# Patient Record
Sex: MALE | Race: OTHER | HISPANIC OR LATINO
[De-identification: names, ages, dates, MRNs, and addresses within clinical notes are randomized per-mention and may not be internally consistent; named-entity substitution may affect disease eponyms.]

---

## 2023-02-24 PROBLEM — Z00.00 ENCOUNTER FOR PREVENTIVE HEALTH EXAMINATION: Status: ACTIVE | Noted: 2023-02-24

## 2023-02-27 ENCOUNTER — APPOINTMENT (OUTPATIENT)
Dept: PEDIATRIC ORTHOPEDIC SURGERY | Facility: CLINIC | Age: 43
End: 2023-02-27
Payer: SELF-PAY

## 2023-02-27 ENCOUNTER — APPOINTMENT (OUTPATIENT)
Dept: PEDIATRIC ORTHOPEDIC SURGERY | Facility: CLINIC | Age: 43
End: 2023-02-27

## 2023-02-27 VITALS — HEIGHT: 66 IN | WEIGHT: 205 LBS | BODY MASS INDEX: 32.95 KG/M2

## 2023-02-27 VITALS — DIASTOLIC BLOOD PRESSURE: 97 MMHG | SYSTOLIC BLOOD PRESSURE: 158 MMHG | TEMPERATURE: 97.1 F

## 2023-02-27 DIAGNOSIS — Z83.3 FAMILY HISTORY OF DIABETES MELLITUS: ICD-10-CM

## 2023-02-27 DIAGNOSIS — Z82.49 FAMILY HISTORY OF ISCHEMIC HEART DISEASE AND OTHER DISEASES OF THE CIRCULATORY SYSTEM: ICD-10-CM

## 2023-02-27 PROCEDURE — 99202 OFFICE O/P NEW SF 15 MIN: CPT | Mod: 25

## 2023-02-27 PROCEDURE — 20552 NJX 1/MLT TRIGGER POINT 1/2: CPT

## 2023-02-27 RX ORDER — PREDNISONE 10 MG/1
10 TABLET ORAL
Qty: 60 | Refills: 0 | Status: ACTIVE | COMMUNITY
Start: 2023-02-27 | End: 1900-01-01

## 2023-02-27 NOTE — ASSESSMENT
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  I have placed him on a 2-week course of prednisone with GI precautions.  He is also been placed on Flexeril and will use oxycodone prescribed by the hospital at night to help him sleep.  Physical therapy has been ordered as well he is not capable of working as a .  He will return in 2 weeks for further evaluation

## 2023-02-27 NOTE — PHYSICAL EXAM
[de-identified] : His examination today reveals he is obviously very uncomfortable with a slow gait.  It is symmetric.  He has moderate restriction of motion in the lumbar spine in all planes with obvious discomfort.  There is diffuse spasm and tenderness on both sides of the midline on the lumbar segment with a trigger point in the right lumbar musculature.  He has discomfort over the sciatic notches on both sides as well.  Both lower extremities are symmetric in appearance without atrophy and move well at all individual joints.  Straight leg raising is positive on the right at 40 degrees and very uncomfortable on the left.  Neurologically he has no gross focal motor deficit.  Deep tendon reflexes 2/4 both knees and 1/4 ankles.\par \par His MRI from [facility] recently performed was reviewed written results are on the chart documenting disc at L4-5 which has extruded and is behind the L5 vertebral body.  There is neurologic effacement.

## 2023-02-27 NOTE — HISTORY OF PRESENT ILLNESS
[de-identified] : This 43-year-old healthy pleasant gentleman is seen today for evaluation of severe back pain.  This patient had acute onset of severe back pain approximately 3 weeks ago with no obvious history of trauma precipitating event.  He does work for a  though again no obvious injury.  His back pain progressed quite rapidly with radiation into both lower extremities right greater than left.  He has numbness and paresthesias on both sides right greater than left.  He did present to the emergency room at [facility] just recently where MRI was performed revealing a herniated disc and he was sent home with prescriptions for Percocet Motrin and omeprazole.  He has not had any improvement whatsoever and is significantly disabled.  He did see his internist at [facility] who prescribed a Medrol Dosepak he is retirement into this no improvement noted.  Prior to this he had no significant history of back pain requiring medical attention.  His past history is negative

## 2023-02-27 NOTE — PROCEDURE
[FreeTextEntry1] : Under sterile technique with an alcohol prep to trigger point along the right lumbar musculature was injected with 40 mg of methylprednisolone and 4 cc of 1% lidocaine with a Band-Aid dressing applied at the conclusion this was tolerated well

## 2023-03-13 ENCOUNTER — APPOINTMENT (OUTPATIENT)
Dept: PEDIATRIC ORTHOPEDIC SURGERY | Facility: CLINIC | Age: 43
End: 2023-03-13
Payer: SELF-PAY

## 2023-03-13 ENCOUNTER — TRANSCRIPTION ENCOUNTER (OUTPATIENT)
Age: 43
End: 2023-03-13

## 2023-03-13 VITALS — BODY MASS INDEX: 32.95 KG/M2 | WEIGHT: 205 LBS | TEMPERATURE: 97.2 F | HEIGHT: 66 IN

## 2023-03-13 VITALS — SYSTOLIC BLOOD PRESSURE: 148 MMHG | DIASTOLIC BLOOD PRESSURE: 90 MMHG

## 2023-03-13 DIAGNOSIS — M51.26 OTHER INTERVERTEBRAL DISC DISPLACEMENT, LUMBAR REGION: ICD-10-CM

## 2023-03-13 DIAGNOSIS — M79.18 MYALGIA, OTHER SITE: ICD-10-CM

## 2023-03-13 PROCEDURE — 99212 OFFICE O/P EST SF 10 MIN: CPT

## 2023-03-13 RX ORDER — PREDNISONE 10 MG/1
10 TABLET ORAL
Qty: 60 | Refills: 0 | Status: ACTIVE | COMMUNITY
Start: 2023-03-13 | End: 1900-01-01

## 2023-03-13 NOTE — PHYSICAL EXAM
[de-identified] : On exam he is visibly much more comfortable than his prior visit.  He has better motion to the back with less spasm and tenderness.  His gait however reveals he can heel walk but he cannot go up on his toes on either side.  Straight leg raising is positive on both sides at 45 degrees.  Motor testing reveals he is weak in his hamstrings as well as gastrocs.  Knee jerks are 2/4 ankle jerks 0/4

## 2023-03-13 NOTE — HISTORY OF PRESENT ILLNESS
[de-identified] : This 43-year-old returns for reevaluation of his back and leg pain.  Following a 2-week course of prednisone he has improved with regards to his back pain he still however has paresthesias and numbness in both lower extremities right greater than left.  He has discomfort on ambulation.  No bladder or bowel dysfunction he does feel weakness in his legs.

## 2023-03-13 NOTE — ASSESSMENT
[FreeTextEntry1] : Impression: Herniated disc lumbar spine\par \par As he had a fair amount of improvement with the prednisone I have ordered a second 2-week course.  Physical therapy prescription has been provided.  I have advised this patient and his wife as to the significance of the weakness.  If this is not improving and in fact worsening it is likely he will come to surgical treatment.  We will try to get him into pain management for possible epidural steroid injections.
[Follow-Up] : a follow-up evaluation of

## 2023-03-20 ENCOUNTER — APPOINTMENT (OUTPATIENT)
Dept: PAIN MANAGEMENT | Facility: CLINIC | Age: 43
End: 2023-03-20
Payer: SELF-PAY

## 2023-03-20 VITALS
DIASTOLIC BLOOD PRESSURE: 98 MMHG | OXYGEN SATURATION: 98 % | HEART RATE: 123 BPM | SYSTOLIC BLOOD PRESSURE: 144 MMHG | BODY MASS INDEX: 32.95 KG/M2 | WEIGHT: 205 LBS | HEIGHT: 66 IN

## 2023-03-20 PROCEDURE — 99243 OFF/OP CNSLTJ NEW/EST LOW 30: CPT

## 2023-03-20 NOTE — PHYSICAL EXAM
[de-identified] : General: Well-developed and well-nourished.  No acute distress.\par Psychiatric: Behavior was cooperative  \par Head:  Normocephalic and atraumatic\par Eyes:  Sclera white. Conjunctiva and eyelids pink and moist without discharge.\par Cardiovascular:  Regular\par Respiratory:  Trachea midline. Normal effort.\par No accessory muscle use with respiration\par Abdomen: Non distended, soft and nontender\par Skin:  No rashes, ulcers, or lesions appreciated.\par Neck: There is no pain with extension,     ROM wnl\par Back there is restricted range of motion in all planes, pain with lumbar extension\par SLR positive RIGHT\par Extremities: No edema \par Musculoskeletal: Moving all extremities freely \par Neuro: CN 2-12 Grossly intact\par Sensation to light touch is intact in all extremities\par Gait:  Antalgic gait

## 2023-03-20 NOTE — HISTORY OF PRESENT ILLNESS
[10 (pain as bad as you can imagine)] : 1. What number best describes your pain on average in the past week? 10/10 pain [10 (completely interferes)] : 3. What number best describes how, during the past week, pain has interfered with your general activity? 10/10 pain [Back Pain] : back pain [___ wks] : [unfilled] week(s) ago [Constant] : constant [10] : a maximum pain level of 10/10 [Sharp] : sharp [Dull] : dull [Aching] : aching [Throbbing] : throbbing [Shooting] : shooting [Electric] : electric [Standing] : standing [Walking] : walking [Lifting] : lifting [Medications] : medications [Heat] : heat [Ice] : ice [FreeTextEntry1] : HPI - Mr. CHARITY ANGELA is a 43 year M with PHx as below, referred by Dr Aguero who presents today with chief complaint of low back and lower extremity pain. Reports that it developed insidiously several weeks ago, prompting him to go to the emergency room at [facility] where he had an MRI completed.  The pain is located in the lower back;  there is radiation of the pain into the right lower extremity.  The pain is presently 10 out of 10 in severity on the numerical rating scale.  The pain is constant.  There is diurnal worsening during the night.  The pain is aggravated with standing, walking, bending.  The pain improves mildly upon laying down.  He reports the pain is physically and emotionally disabling for him as it impairs his ability to go about activities of daily living such as routine housework, cleaning, grooming himself.  \par \par Lengthy hemispheric weakness of compromise he had been seeing Dr. Aguero who had provided him with cyclobenzaprine, prednisone that had been providing some mild relief however his gait remains impaired\par  \par Reports there is no present numbness, there is no weakness. Patient denies any bowel/bladder incontinence, no saddle/perineal anesthesia or any other red flag signs or symptoms. Reports regular BMs.  [FreeTextEntry2] : 30 [FreeTextEntry7] : Patient presents with lower back pain radiating to right toes. [de-identified] : tingling, numbness [FreeTextEntry4] : PT

## 2023-03-20 NOTE — CONSULT LETTER
[Dear  ___] : Dear  [unfilled], [Consult Letter:] : I had the pleasure of evaluating your patient, [unfilled]. [Please see my note below.] : Please see my note below. [Consult Closing:] : Thank you very much for allowing me to participate in the care of this patient.  If you have any questions, please do not hesitate to contact me. [Sincerely,] : Sincerely, [FreeTextEntry3] : Ravinder Lezama DO

## 2023-03-20 NOTE — REVIEW OF SYSTEMS
[Back Pain] : back pain [Radiating Pain] : radiating pain [Decreased ROM] : decreased range of motion [Weakness] : weakness [Negative] : Heme/Lymph

## 2023-03-20 NOTE — ASSESSMENT
[FreeTextEntry1] : Mr. CHARITY ANGELA is a 43 year M suffering from low back and right leg pain, that based upon today's subjective complaints, physical examination, and MRI review, is likely secondary to lumbar radiculopathy\par \par >> Medications\par \par Chronic opioid use for non-malignant pain, in particular at high doses would not be recommended since it can potentially lead to hyperalgesia (hypersensitivity), tolerance and addiction. \par  \par Gabapentin 400 m ghs\par  \par >> Interventions\par  \par Arrange for RIGHT L4 and L5 transforaminal epidural steroid injection under fluoroscopic guidance. Success rate and possible complications discussed with patient in detail. \par  \par >> Therapy and Other Modalities\par  \par Continue with daily home stretching regimen\par \par >> Imaging and Other Studies\par \par See Media \par \par >> Consults\par \par None ordered

## 2023-03-20 NOTE — DATA REVIEWED
[FreeTextEntry1] : MRI lumbar \par February 22, 2023\par L4-5 posterior disc herniation with extrusion extending posteriorly to the L5 vertebral body.  There is central and slightly eccentric to the right, mild central stenosis at L4-5 secondary to hypertrophy of the ligamentum flavum and mild diffuse disc bulge.\par L5-S1 there is mild diffuse disc bulge eccentric to the left without significant foraminal or central compromise.

## 2023-03-23 RX ORDER — MELOXICAM 7.5 MG/1
7.5 TABLET ORAL
Qty: 15 | Refills: 0 | Status: ACTIVE | COMMUNITY
Start: 2023-03-23 | End: 1900-01-01

## 2023-04-12 ENCOUNTER — APPOINTMENT (OUTPATIENT)
Dept: PAIN MANAGEMENT | Facility: HOSPITAL | Age: 43
End: 2023-04-12

## 2023-04-12 ENCOUNTER — TRANSCRIPTION ENCOUNTER (OUTPATIENT)
Age: 43
End: 2023-04-12

## 2023-04-28 ENCOUNTER — APPOINTMENT (OUTPATIENT)
Dept: PAIN MANAGEMENT | Facility: CLINIC | Age: 43
End: 2023-04-28
Payer: SELF-PAY

## 2023-04-28 VITALS
OXYGEN SATURATION: 98 % | BODY MASS INDEX: 30.53 KG/M2 | SYSTOLIC BLOOD PRESSURE: 120 MMHG | HEIGHT: 66 IN | WEIGHT: 190 LBS | HEART RATE: 88 BPM | DIASTOLIC BLOOD PRESSURE: 67 MMHG

## 2023-04-28 PROCEDURE — 99214 OFFICE O/P EST MOD 30 MIN: CPT

## 2023-04-28 NOTE — PHYSICAL EXAM
[de-identified] : General: Well-developed and well-nourished.  No acute distress.\par Psychiatric: Behavior was cooperative  \par Head:  Normocephalic and atraumatic\par Eyes:  Sclera white. Conjunctiva and eyelids pink and moist without discharge.\par Cardiovascular:  Regular\par Respiratory:  Trachea midline. Normal effort.\par No accessory muscle use with respiration\par Abdomen: Non distended, soft and nontender\par Skin:  No rashes, ulcers, or lesions appreciated.\par Neck: There is no pain with extension,     ROM wnl\par Back there is restricted range of motion in all planes, pain with lumbar extension\par SLR positive RIGHT\par Extremities: No edema \par Musculoskeletal: Moving all extremities freely \par Neuro: CN 2-12 Grossly intact\par Sensation to light touch is intact in all extremities\par Gait:  Antalgic gait

## 2023-04-28 NOTE — HISTORY OF PRESENT ILLNESS
[5] : a current pain level of 5/10 [FreeTextEntry1] : Interval Note:\par \par Since last visit the pain intensity has improved substantially, approximately 80% relief status post right L4 and L5 lumbar transforaminal epidural steroid injection on April 12, 2023 \par \par Expressing gratitude however still with\par \par Medication has been allowing patient to go about activities of daily living with less pain.\par \par Moving bowels regularly. No recent falls. \par \par Denies weakness, numbness. Patient denies any bowel/bladder incontinence, no saddle/perineal anesthesia or any other red flag signs or symptoms. \par \par \par \par --\par \par HPI - Mr. CHARITY ANGELA is a 43 year M with PHx as below, referred by Dr Aguero who presents today with chief complaint of low back and lower extremity pain. Reports that it developed insidiously several weeks ago, prompting him to go to the emergency room at [facility] where he had an MRI completed.  The pain is located in the lower back;  there is radiation of the pain into the right lower extremity.  The pain is presently 10 out of 10 in severity on the numerical rating scale.  The pain is constant.  There is diurnal worsening during the night.  The pain is aggravated with standing, walking, bending.  The pain improves mildly upon laying down.  He reports the pain is physically and emotionally disabling for him as it impairs his ability to go about activities of daily living such as routine housework, cleaning, grooming himself.  \par \par Lengthy hemispheric weakness of compromise he had been seeing Dr. Aguero who had provided him with cyclobenzaprine, prednisone that had been providing some mild relief however his gait remains impaired\par  \par Reports there is no present numbness, there is no weakness. Patient denies any bowel/bladder incontinence, no saddle/perineal anesthesia or any other red flag signs or symptoms. Reports regular BMs.

## 2023-05-25 ENCOUNTER — APPOINTMENT (OUTPATIENT)
Dept: PAIN MANAGEMENT | Facility: HOSPITAL | Age: 43
End: 2023-05-25

## 2023-05-25 ENCOUNTER — TRANSCRIPTION ENCOUNTER (OUTPATIENT)
Age: 43
End: 2023-05-25

## 2023-06-08 ENCOUNTER — APPOINTMENT (OUTPATIENT)
Dept: PAIN MANAGEMENT | Facility: CLINIC | Age: 43
End: 2023-06-08
Payer: SELF-PAY

## 2023-06-08 VITALS
HEART RATE: 75 BPM | DIASTOLIC BLOOD PRESSURE: 79 MMHG | HEIGHT: 66 IN | SYSTOLIC BLOOD PRESSURE: 125 MMHG | WEIGHT: 190 LBS | BODY MASS INDEX: 30.53 KG/M2 | OXYGEN SATURATION: 98 %

## 2023-06-08 PROCEDURE — 99213 OFFICE O/P EST LOW 20 MIN: CPT

## 2023-06-08 RX ORDER — TIZANIDINE 2 MG/1
2 TABLET ORAL
Qty: 60 | Refills: 1 | Status: ACTIVE | COMMUNITY
Start: 2023-06-08 | End: 1900-01-01

## 2023-06-08 NOTE — PHYSICAL EXAM
[de-identified] : General Appearance:\par Level of consciousness: Alert\par Body habitus: Well-nourished\par Hygiene: Clean\par \par Psychiatric:\par Appropriate mood and affect. \par Cooperative.\par  \par Skin: Nailbeds pink with no cyanosis or clubbing.\par Lesions or rashes: None observed\par \par Head and Neck: The head is normocephalic and atraumatic\par Mouth and throat: Trachea Midline, teeth and gums are without obvious lesion\par \par Respiratory:\par Breathing pattern: Normal\par Use of accessory muscles: Absent\par Cough: Absent\par \par Cardiovascular System:\par Pulse: Regular\par Cyanosis: Absent\par \par Abdomen:\par Inspection: No distention\par Visible pulsations: Absent\par \par Musculoskeletal System:\par Muscle strength:   strength is normal bilaterally.\par Gait: Steady, NO assistive device\par Posture: Upright\par \par Spine:\par No gross deformity or signs of trauma.\par Spinous processes are midline. NO tenderness of spinous processes.\par Paraspinal musculature is NOT hypertonic\par NO discomfort is noted with flexion\par MILD discomfort is noted with extension\par \par Straight leg raise test is negative bilaterally. \par \par Neurological System:\par Cranial nerves 2-12: Grossly intact \par Motor function: Moving all extremities against gravity\par Dorsi/plantar flexion is normal bilaterally.\par Sensation: No gross deficit to light touch

## 2023-06-08 NOTE — HISTORY OF PRESENT ILLNESS
[0] : a current pain level of 0/10 [Back Pain] : back pain [FreeTextEntry1] : Interval Note:\par \par \par Status post right L4 and L5 lumbar transforaminal epidural steroid injection on May 25, 2023 with report of 95 % relief\par \par Continues to go to physical therapy\par  \par Medication (gabapentin) has been allowing patient to go about activities of daily living with less pain.\par \par Moving bowels regularly. No recent falls. \par \par Denies weakness, numbness. Patient denies any bowel/bladder incontinence, no saddle/perineal anesthesia or any other red flag signs or symptoms. \par \par --\par \par 80% relief status post right L4 and L5 lumbar transforaminal epidural steroid injection on April 12, 2023 \par \par --\par \par HPI - Mr. CHARITY ANGELA is a 43 year M with PHx as below, referred by Dr Aguero who presents today with chief complaint of low back and lower extremity pain. Reports that it developed insidiously several weeks ago, prompting him to go to the emergency room at [facility] where he had an MRI completed.  The pain is located in the lower back;  there is radiation of the pain into the right lower extremity.  The pain is presently 10 out of 10 in severity on the numerical rating scale.  The pain is constant.  There is diurnal worsening during the night.  The pain is aggravated with standing, walking, bending.  The pain improves mildly upon laying down.  He reports the pain is physically and emotionally disabling for him as it impairs his ability to go about activities of daily living such as routine housework, cleaning, grooming himself.  \par \par Lengthy hemispheric weakness of compromise he had been seeing Dr. Aguero who had provided him with cyclobenzaprine, prednisone that had been providing some mild relief however his gait remains impaired\par  \par Reports there is no present numbness, there is no weakness. Patient denies any bowel/bladder incontinence, no saddle/perineal anesthesia or any other red flag signs or symptoms. Reports regular BMs.

## 2023-06-08 NOTE — REVIEW OF SYSTEMS
[Back Pain] : back pain [Radiating Pain] : radiating pain [Joint Pain] : joint pain [Negative] : Heme/Lymph

## 2023-06-08 NOTE — ASSESSMENT
[FreeTextEntry1] : Mr. CHARITY ANGELA is a 43 year M suffering from low back and right leg pain, that based upon today's subjective complaints, physical examination, and MRI review, is likely secondary to lumbar radiculopathy\par \par Return to work letter provided /scanned to chart\par \par >> Medications\par \par Chronic opioid use for non-malignant pain, in particular at high doses would not be recommended since it can potentially lead to hyperalgesia (hypersensitivity), tolerance and addiction. \par  \par Gabapentin 400 m ghs\par \par Tizanidine 2 mg po bid \par  \par >> Interventions\par  \par Excellent relief following SOCO series\par \par >> Therapy and Other Modalities\par  \par Continue with daily home stretching regimen\par \par >> Imaging and Other Studies\par \par See Media \par \par >> Consults\par \par None ordered

## 2023-07-14 ENCOUNTER — APPOINTMENT (OUTPATIENT)
Dept: PAIN MANAGEMENT | Facility: CLINIC | Age: 43
End: 2023-07-14
Payer: SELF-PAY

## 2023-07-14 VITALS
OXYGEN SATURATION: 97 % | HEART RATE: 63 BPM | SYSTOLIC BLOOD PRESSURE: 132 MMHG | DIASTOLIC BLOOD PRESSURE: 87 MMHG | BODY MASS INDEX: 31.98 KG/M2 | HEIGHT: 66 IN | WEIGHT: 199 LBS

## 2023-07-14 PROCEDURE — 99213 OFFICE O/P EST LOW 20 MIN: CPT

## 2023-07-14 NOTE — ASSESSMENT
[FreeTextEntry1] : Mr. CHARITY ANGELA is a 43 year M suffering from low back and right leg pain, that based upon today's subjective complaints, physical examination, and MRI review, is likely secondary to lumbar radiculopathy\par \par Return to work letter provided /scanned to chart\par \par >> Medications\par \par Chronic opioid use for non-malignant pain, in particular at high doses would not be recommended since it can potentially lead to hyperalgesia (hypersensitivity), tolerance and addiction. \par  \par Gabapentin 400 m ghs \par  \par >> Interventions\par  \par Arrange for LEFT L4/5 interlaminar epidural steroid injection under fluoroscopic guidance. Success rate and possible complications discussed with patient in detail. \par \par >> Therapy and Other Modalities\par  \par Continue with daily home stretching regimen\par \par >> Imaging and Other Studies\par \par See Media \par \par >> Consults\par \par NSX eval

## 2023-07-14 NOTE — PHYSICAL EXAM
[de-identified] : General: AAOx3, NAD\par HEENT: EOMI, Sclera white\par CVS: +2 Pulses\par Pulmonary: No Respiratory Distress\par Abdomen: Soft, NT, ND\par MSK: Moving all extremities against gravity\par Neuro: Sensation I to LT\par SLR positive LEFT\par Extremities: (-)Edema\par Derm: No Rash\par Psych: Calm, Cooperative\par

## 2023-07-14 NOTE — HISTORY OF PRESENT ILLNESS
[4] : a current pain level of 4/10 [Back Pain] : back pain [FreeTextEntry1] : Interval Note:\par  \par Continues to go to physical therapy however pain is arising also on the left side, with report of tingling into the leg as well\par  \par Medication (gabapentin) has been allowing patient to go about activities of daily living with less pain.\par \par Moving bowels regularly. No recent falls. \par \par Patient denies any bowel/bladder incontinence, no saddle/perineal anesthesia or any other red flag signs or symptoms. \par \par --\par  t right L4 and L5 lumbar transforaminal epidural steroid injection on May 25, 2023 with report of 95 % relief\par \par 80% relief status post right L4 and L5 lumbar transforaminal epidural steroid injection on April 12, 2023 \par \par --\par \par HPI - Mr. CHARITY ANGELA is a 43 year M with PHx as below, referred by Dr Aguero who presents today with chief complaint of low back and lower extremity pain. Reports that it developed insidiously several weeks ago, prompting him to go to the emergency room at [facility] where he had an MRI completed.  The pain is located in the lower back;  there is radiation of the pain into the right lower extremity.  The pain is presently 10 out of 10 in severity on the numerical rating scale.  The pain is constant.  There is diurnal worsening during the night.  The pain is aggravated with standing, walking, bending.  The pain improves mildly upon laying down.  He reports the pain is physically and emotionally disabling for him as it impairs his ability to go about activities of daily living such as routine housework, cleaning, grooming himself.  \par \par Lengthy hemispheric weakness of compromise he had been seeing Dr. Aguero who had provided him with cyclobenzaprine, prednisone that had been providing some mild relief however his gait remains impaired\par  \par Reports there is no present numbness, there is no weakness. Patient denies any bowel/bladder incontinence, no saddle/perineal anesthesia or any other red flag signs or symptoms. Reports regular BMs.

## 2023-08-09 ENCOUNTER — APPOINTMENT (OUTPATIENT)
Dept: PAIN MANAGEMENT | Facility: HOSPITAL | Age: 43
End: 2023-08-09

## 2023-08-09 ENCOUNTER — TRANSCRIPTION ENCOUNTER (OUTPATIENT)
Age: 43
End: 2023-08-09

## 2023-08-23 ENCOUNTER — APPOINTMENT (OUTPATIENT)
Dept: PAIN MANAGEMENT | Facility: CLINIC | Age: 43
End: 2023-08-23
Payer: SELF-PAY

## 2023-08-23 VITALS
HEART RATE: 80 BPM | SYSTOLIC BLOOD PRESSURE: 121 MMHG | DIASTOLIC BLOOD PRESSURE: 73 MMHG | OXYGEN SATURATION: 97 % | BODY MASS INDEX: 30.22 KG/M2 | WEIGHT: 188 LBS | HEIGHT: 66 IN

## 2023-08-23 DIAGNOSIS — M79.10 MYALGIA, UNSPECIFIED SITE: ICD-10-CM

## 2023-08-23 DIAGNOSIS — M48.061 SPINAL STENOSIS, LUMBAR REGION WITHOUT NEUROGENIC CLAUDICATION: ICD-10-CM

## 2023-08-23 DIAGNOSIS — M54.16 RADICULOPATHY, LUMBAR REGION: ICD-10-CM

## 2023-08-23 PROCEDURE — 99214 OFFICE O/P EST MOD 30 MIN: CPT

## 2023-08-23 RX ORDER — CYCLOBENZAPRINE HYDROCHLORIDE 10 MG/1
10 TABLET, FILM COATED ORAL TWICE DAILY
Qty: 30 | Refills: 1 | Status: ACTIVE | COMMUNITY
Start: 2023-02-27 | End: 1900-01-01

## 2023-08-23 RX ORDER — GABAPENTIN 400 MG/1
400 CAPSULE ORAL
Qty: 60 | Refills: 1 | Status: ACTIVE | COMMUNITY
Start: 2023-03-20 | End: 1900-01-01

## 2023-08-23 NOTE — HISTORY OF PRESENT ILLNESS
[0] : a current pain level of 0/10 [FreeTextEntry1] : Interval Note:    Status post left L4/L5 interlaminar epidural steroid injection on August 9, 2023 - 90% relief   Moving bowels regularly. No recent falls.   Patient denies any bowel/bladder incontinence, no saddle/perineal anesthesia or any other red flag signs or symptoms.   -- Right L4 and L5 lumbar transforaminal epidural steroid injection on May 25, 2023 with report of 95 % relief  80% relief status post right L4 and L5 lumbar transforaminal epidural steroid injection on April 12, 2023   --  HPI - Mr. CHARITY ANGELA is a 43 year M with PHx as below, referred by Dr Aguero who presents today with chief complaint of low back and lower extremity pain. Reports that it developed insidiously several weeks ago, prompting him to go to the emergency room at [facility] where he had an MRI completed.  The pain is located in the lower back;  there is radiation of the pain into the right lower extremity.  The pain is presently 10 out of 10 in severity on the numerical rating scale.  The pain is constant.  There is diurnal worsening during the night.  The pain is aggravated with standing, walking, bending.  The pain improves mildly upon laying down.  He reports the pain is physically and emotionally disabling for him as it impairs his ability to go about activities of daily living such as routine housework, cleaning, grooming himself.    Lengthy hemispheric weakness of compromise he had been seeing Dr. Aguero who had provided him with cyclobenzaprine, prednisone that had been providing some mild relief however his gait remains impaired   Reports there is no present numbness, there is no weakness. Patient denies any bowel/bladder incontinence, no saddle/perineal anesthesia or any other red flag signs or symptoms. Reports regular BMs.

## 2023-08-23 NOTE — ASSESSMENT
[FreeTextEntry1] : Mr. CHARITY ANGELA is a 43 year M suffering from low back and right leg pain, that based upon today's subjective complaints, physical examination, and MRI review, is likely secondary to lumbar radiculopathy  Return to work letter provided /scanned to chart  >> Medications  Chronic opioid use for non-malignant pain, in particular at high doses would not be recommended since it can potentially lead to hyperalgesia (hypersensitivity), tolerance and addiction.    Gabapentin 400 m s    >> Interventions  Excellent relief following SOCO earlier this month  >> Therapy and Other Modalities   Continue with daily home stretching regimen  >> Imaging and Other Studies  See Media   >> Consults  NSX eval - considering 3x SOCO's since this Spring and possibility of recurrence, for surgical eval

## 2023-08-23 NOTE — PHYSICAL EXAM
[de-identified] : General: AAOx3, NAD HEENT: EOMI, Sclera white CVS: +2 Pulses Pulmonary: No Respiratory Distress Abdomen: Soft, NT, ND MSK: Moving all extremities against gravity Neuro: Sensation I to LT Extremities: (-)Edema Derm: No Rash Psych: Calm, Cooperative

## 2023-08-30 NOTE — PHYSICAL EXAM
[General Appearance - Alert] : alert [General Appearance - In No Acute Distress] : in no acute distress [Fluency] : fluency intact [Comprehension] : comprehension intact [Cranial Nerves Optic (II)] : visual acuity intact bilaterally,  pupils equal round and reactive to light [Cranial Nerves Oculomotor (III)] : extraocular motion intact [Cranial Nerves Trigeminal (V)] : facial sensation intact symmetrically [Cranial Nerves Facial (VII)] : face symmetrical [Cranial Nerves Glossopharyngeal (IX)] : tongue and palate midline [Cranial Nerves Vestibulocochlear (VIII)] : hearing was intact bilaterally [Cranial Nerves Accessory (XI - Cranial And Spinal)] : head turning and shoulder shrug symmetric [Cranial Nerves Hypoglossal (XII)] : there was no tongue deviation with protrusion [Motor Strength] : muscle strength was normal in all four extremities [Sensation Tactile Decrease] : light touch was intact [Abnormal Walk] : normal gait [2+] : Ankle jerk left 2+

## 2023-08-30 NOTE — HISTORY OF PRESENT ILLNESS
[de-identified] : CHARITY ANGELA is a 43 year male with no significant medical history who presents to the office today for neurosurgical consultation at the request of Dr. Lezama due to lumbar disc herniation and radiculopathy.   The pain is characterized as sharp, aching, shooting, electric, throbbing in nature, 10 out of 10 on the pain scale.  It started several weeks ago prompting him to go to [facility]. It is exacerbated by standing, walking, bending, worse at night, and relieved partially by rest.  It radiates from the low back to right lower extremity. The pain interferes with his ability to perform ADLs such as routine housework, cleaning, grooming. There has been no known trauma precipitating the pain.  The patient denies numbness of extremities, weakness of extremities, bowel or bladder incontinence and gait disturbance.  He has tried SOCO (Received post left L4/L5 interlaminar epidural steroid injection, Right L4 and L5 lumbar transforaminal SOCO on 5/25/23, right L4 and L5 lumbar transforaminal SOCO on 4/12/23 , PT, and pain medications including Prednisone, Cyclobenzaprine, Gabapentin, Lidocaine patch in the last *** without relief.  He has undergone imaging in the form of *** which revealed *** (see detailed report below).   He underwent imaging in the form of MR lumbar spine which revealed L4-5 posterior disc herniation with extrusion extending posteriorly to the L5 vertebral body. There is central and slightly eccentric to the right, mild central stenosis at L4-5 secondary to hypertrophy of the ligamentum flavum and mild diffuse disc bulge. L5-S1 there is mild diffuse disc bulge eccentric to the left without significant foraminal or central compromise,  Surg Hx:  none   Meds:  Cyclobenzaprine 10 mg BID, Gabapentin 400 mg, Lidocaine, Meloxicam, Prednisone   Allergies: NKDA   Soc Hx: ***smoker, ***EtOH, lives with ***, works as

## 2023-08-30 NOTE — ASSESSMENT
[FreeTextEntry1] : Cody Disla is a 43 year old man with no significant medical history presenting for lumbar disc extrusion refractory to SOCO, PT, and multiple pain medications.  I have personally reviewed his imaging and with use of the spine model for interpretation ***.   I have discussed the natural history and treatment options for lumbar radiculopathy due to disc herniation with the patient. I explained the indications for observation, conservative management, medical management, physical therapy, pain management approaches and surgery. I explained the different types and surgical approaches including decompression only procedures and instrumented fusions. I discussed the risks, benefits, possible complications and expected outcome related to each treatment option. The risks of surgery were discussed in detail including but not limited to postoperative infection at the surgical site, hospital acquired pneumonia, hospital acquired urinary tract infection, postoperative meningitis, wound dehiscence, CSF leak, stroke (ischemic and hemorrhagic), postoperative seizures, worsening motor function due to spinal cord or nerve injury, postoperative visual deficit which could be permanent (blindness) when surgery is performed in a prone position, cardiovascular complications (MI, PE, DVT) and I also explained that some of these complications could lead to sepsis, coma or even death. I discussed the fact that some of these complications may require subsequent surgical procedure(s) to correct them.  In the end I recommend surgical intervention in the form of ***.  He will need further workup including lumbar flexion, extension X-rays and AP/Lateral, CT lumbar spine. The patient will need cardiac clearance and will need NP clearance with the Presurgical Testing Department at [facility] prior to the procedure.  At the end of the discussion, the patient opted to move forward with the above plan.  Our office will reach out to coordinate a surgical date in the coming weeks.  The patient understands the plan of care and is in agreement.  All questions answered to patient satisfaction.

## 2023-08-30 NOTE — DATA REVIEWED
[de-identified] : 2/2023 MRI lumbar spine  L4-5 posterior disc herniation with extrusion extending posteriorly to the L5 vertebral body. There is central and slightly eccentric to the right, mild central stenosis at L4-5 secondary to hypertrophy of the ligamentum flavum and mild diffuse disc bulge. L5-S1 there is mild diffuse disc bulge eccentric to the left without significant foraminal or central comprom

## 2023-09-05 ENCOUNTER — APPOINTMENT (OUTPATIENT)
Dept: NEUROSURGERY | Facility: CLINIC | Age: 43
End: 2023-09-05

## 2024-03-29 ENCOUNTER — RX RENEWAL (OUTPATIENT)
Age: 44
End: 2024-03-29

## 2024-03-29 RX ORDER — LIDOCAINE 5% 700 MG/1
5 PATCH TOPICAL
Qty: 30 | Refills: 3 | Status: ACTIVE | COMMUNITY
Start: 2023-03-23 | End: 1900-01-01
